# Patient Record
Sex: MALE | Race: WHITE
[De-identification: names, ages, dates, MRNs, and addresses within clinical notes are randomized per-mention and may not be internally consistent; named-entity substitution may affect disease eponyms.]

---

## 2019-09-18 ENCOUNTER — HOSPITAL ENCOUNTER (EMERGENCY)
Dept: HOSPITAL 75 - ER | Age: 39
Discharge: HOME | End: 2019-09-18
Payer: COMMERCIAL

## 2019-09-18 VITALS — HEIGHT: 65.75 IN | BODY MASS INDEX: 32.41 KG/M2 | WEIGHT: 199.3 LBS

## 2019-09-18 VITALS — SYSTOLIC BLOOD PRESSURE: 110 MMHG | DIASTOLIC BLOOD PRESSURE: 73 MMHG

## 2019-09-18 DIAGNOSIS — I10: ICD-10-CM

## 2019-09-18 DIAGNOSIS — N13.2: Primary | ICD-10-CM

## 2019-09-18 LAB
ALBUMIN SERPL-MCNC: 4.4 GM/DL (ref 3.2–4.5)
ALP SERPL-CCNC: 80 U/L (ref 40–136)
ALT SERPL-CCNC: 57 U/L (ref 0–55)
APTT PPP: YELLOW S
BACTERIA #/AREA URNS HPF: (no result) /HPF
BASOPHILS # BLD AUTO: 0 10^3/UL (ref 0–0.1)
BASOPHILS NFR BLD AUTO: 0 % (ref 0–10)
BILIRUB SERPL-MCNC: 0.8 MG/DL (ref 0.1–1)
BILIRUB UR QL STRIP: NEGATIVE
BUN/CREAT SERPL: 13
CALCIUM SERPL-MCNC: 9.4 MG/DL (ref 8.5–10.1)
CHLORIDE SERPL-SCNC: 106 MMOL/L (ref 98–107)
CO2 SERPL-SCNC: 24 MMOL/L (ref 21–32)
CREAT SERPL-MCNC: 0.95 MG/DL (ref 0.6–1.3)
EOSINOPHIL # BLD AUTO: 0 10^3/UL (ref 0–0.3)
EOSINOPHIL NFR BLD AUTO: 0 % (ref 0–10)
ERYTHROCYTE [DISTWIDTH] IN BLOOD BY AUTOMATED COUNT: 11.6 % (ref 10–14.5)
FIBRINOGEN PPP-MCNC: CLEAR MG/DL
GFR SERPLBLD BASED ON 1.73 SQ M-ARVRAT: > 60 ML/MIN
GLUCOSE SERPL-MCNC: 97 MG/DL (ref 70–105)
GLUCOSE UR STRIP-MCNC: NEGATIVE MG/DL
HCT VFR BLD CALC: 47 % (ref 40–54)
HGB BLD-MCNC: 15.8 G/DL (ref 13.3–17.7)
KETONES UR QL STRIP: (no result)
LEUKOCYTE ESTERASE UR QL STRIP: NEGATIVE
LYMPHOCYTES # BLD AUTO: 0.9 X 10^3 (ref 1–4)
LYMPHOCYTES NFR BLD AUTO: 7 % (ref 12–44)
MANUAL DIFFERENTIAL PERFORMED BLD QL: YES
MCH RBC QN AUTO: 32 PG (ref 25–34)
MCHC RBC AUTO-ENTMCNC: 34 G/DL (ref 32–36)
MCV RBC AUTO: 96 FL (ref 80–99)
MONOCYTES # BLD AUTO: 0.8 X 10^3 (ref 0–1)
MONOCYTES NFR BLD AUTO: 6 % (ref 0–12)
MONOCYTES NFR BLD: 7 %
NEUTROPHILS # BLD AUTO: 11.5 X 10^3 (ref 1.8–7.8)
NEUTROPHILS NFR BLD AUTO: 87 % (ref 42–75)
NEUTS BAND NFR BLD MANUAL: 88 %
NEUTS BAND NFR BLD: 1 %
NITRITE UR QL STRIP: NEGATIVE
PH UR STRIP: 6 [PH] (ref 5–9)
PLATELET # BLD: 237 10^3/UL (ref 130–400)
PMV BLD AUTO: 10.4 FL (ref 7.4–10.4)
POTASSIUM SERPL-SCNC: 4.1 MMOL/L (ref 3.6–5)
PROT SERPL-MCNC: 7.4 GM/DL (ref 6.4–8.2)
PROT UR QL STRIP: (no result)
RBC MORPH BLD: NORMAL
SODIUM SERPL-SCNC: 137 MMOL/L (ref 135–145)
SP GR UR STRIP: 1.01 (ref 1.02–1.02)
UROBILINOGEN UR-MCNC: NORMAL MG/DL
VARIANT LYMPHS NFR BLD MANUAL: 4 %
WBC # BLD AUTO: 13.2 10^3/UL (ref 4.3–11)
WBC #/AREA URNS HPF: (no result) /HPF

## 2019-09-18 PROCEDURE — 85007 BL SMEAR W/DIFF WBC COUNT: CPT

## 2019-09-18 PROCEDURE — 81000 URINALYSIS NONAUTO W/SCOPE: CPT

## 2019-09-18 PROCEDURE — 74176 CT ABD & PELVIS W/O CONTRAST: CPT

## 2019-09-18 PROCEDURE — 96375 TX/PRO/DX INJ NEW DRUG ADDON: CPT

## 2019-09-18 PROCEDURE — 85027 COMPLETE CBC AUTOMATED: CPT

## 2019-09-18 PROCEDURE — 96374 THER/PROPH/DIAG INJ IV PUSH: CPT

## 2019-09-18 PROCEDURE — 36415 COLL VENOUS BLD VENIPUNCTURE: CPT

## 2019-09-18 PROCEDURE — 80053 COMPREHEN METABOLIC PANEL: CPT

## 2019-09-18 NOTE — ED ABDOMINAL PAIN
General


Chief Complaint:  Abdominal/GI Problems


Stated Complaint:  R SIDE PAIN


Nursing Triage Note:  


Pt c/o R sided abd pain and vomiting since 0730 today. Pt reports vomiting 4-5 


times today. Pt states pain started in R flank, radiating to testicle. Pt now 


stating pain is more in abdomen.


Sepsis Screen:  No Definite Risk





History of Present Illness


Date Seen by Provider:  Sep 18, 2019


Time Seen by Provider:  12:43


Initial Comments


Patient admitted with chief complaint right flank pain, radiating to the right 

testicle. Reports onset of 0730 this am. Reports approx. 8 episodes of vomiting.

Denies fever and chills


Severity/Quality:  Mild


Location:  Flank


Radiation:  Groin


Associated Symptoms:  No Chest Pain, No Diaphoresis, No Fever/Chills





Allergies and Home Medications


Allergies


Coded Allergies:  


     No Known Drug Allergies (Unverified , 9/18/19)





Home Medications


Hydrocodone/Acetaminophen 1 Each Tablet, 1 TAB PO Q4-6HR


   Prescribed by: RAMA GUZMAN on 9/18/19 1318


Ketorolac Tromethamine 10 Mg Tablet, 10 MG PO Q6H


   Prescribed by: RAMA GUZMAN on 9/18/19 1318


Ondansetron 8 Mg Tab.rapdis, 8 MG PO Q6H PRN for NAUSEA/VOMITING


   Prescribed by: RAMA GUZMAN on 9/18/19 1318


Tamsulosin HCl 0.4 Mg Cap, 0.4 MG PO DAILY


   Prescribed by: RAMA GUZMAN on 9/18/19 1318





Patient Home Medication List


Home Medication List Reviewed:  Yes





Review of Systems


Review of Systems


Constitutional:  No chills, No diaphoresis, No fever


EENTM:  No Symptoms Reported


Respiratory:  Denies Shortness of Air


Cardiovascular:  Denies Chest Pain


Genitourinary:  Flank Pain; Denies Hematuria


Musculoskeletal:  no symptoms reported


Skin:  no symptoms reported





Past Medical-Social-Family Hx


Patient Social History


Alcohol Use:  Occasionally Uses


Recreational Drug Use:  No


Smoking Status:  Never a Smoker


Recent Foreign Travel:  No


Contact w/Someone Who Travel:  No


Recent Infectious Disease Expo:  No


Recent Hopitalizations:  No





Seasonal Allergies


Seasonal Allergies:  No





Past Medical History


Surgeries:  Yes (hernia, ACL)


Abdominal, Orthopedic


Respiratory:  No


Cardiac:  Yes


Hypertension


Neurological:  No


Genitourinary:  No


Gastrointestinal:  No


Musculoskeletal:  No


Endocrine:  No


HEENT:  No


Cancer:  No


Psychosocial:  No


Integumentary:  No


Blood Disorders:  No





Physical Exam


Vital Signs





Vital Signs - First Documented








 9/18/19





 10:12


 


Temp 36.7


 


Pulse 70


 


Resp 18


 


B/P (MAP) 139/100 (113)


 


Pulse Ox 97


 


O2 Delivery Room Air





Capillary Refill : Less Than 3 Seconds


Height/Weight/BMI


Height: '"


Weight: lbs. oz. kg; 32.00 BMI


Method:


General Appearance:  WD/WN


HEENT:  PERRL/EOMI, normal ENT inspection


Neck:  non-tender, full range of motion


Respiratory:  chest non-tender, lungs clear, normal breath sounds, no 

respiratory distress


Cardiovascular:  regular rate, rhythm, no edema, no murmur


Gastrointestinal:  normal bowel sounds, no organomegaly, tenderness


Extremities:  normal range of motion, non-tender


Back:  normal inspection, CVA tenderness (R)


Neurologic/Psychiatric:  no motor/sensory deficits, alert, normal mood/affect


Skin:  normal color, warm/dry





Progress/Results/Core Measures


Results/Orders


Lab Results





Laboratory Tests








Test


 9/18/19


12:51 9/18/19


13:59 Range/Units


 


 


White Blood Count


 13.2 H


 


 4.3-11.0


10^3/uL


 


Red Blood Count


 4.91 


 


 4.35-5.85


10^6/uL


 


Hemoglobin 15.8   13.3-17.7  G/DL


 


Hematocrit 47   40-54  %


 


Mean Corpuscular Volume 96   80-99  FL


 


Mean Corpuscular Hemoglobin 32   25-34  PG


 


Mean Corpuscular Hemoglobin


Concent 34 


 


 32-36  G/DL





 


Red Cell Distribution Width 11.6   10.0-14.5  %


 


Platelet Count


 237 


 


 130-400


10^3/uL


 


Mean Platelet Volume 10.4   7.4-10.4  FL


 


Neutrophils (%) (Auto) 87 H  42-75  %


 


Lymphocytes (%) (Auto) 7 L  12-44  %


 


Monocytes (%) (Auto) 6   0-12  %


 


Eosinophils (%) (Auto) 0   0-10  %


 


Basophils (%) (Auto) 0   0-10  %


 


Neutrophils # (Auto) 11.5 H  1.8-7.8  X 10^3


 


Lymphocytes # (Auto) 0.9 L  1.0-4.0  X 10^3


 


Monocytes # (Auto) 0.8   0.0-1.0  X 10^3


 


Eosinophils # (Auto)


 0.0 


 


 0.0-0.3


10^3/uL


 


Basophils # (Auto)


 0.0 


 


 0.0-0.1


10^3/uL


 


Neutrophils % (Manual) 88    %


 


Lymphocytes % (Manual) 4    %


 


Monocytes % (Manual) 7    %


 


Band Neutrophils 1    %


 


Blood Morphology Comment NORMAL    


 


Sodium Level 137   135-145  MMOL/L


 


Potassium Level 4.1   3.6-5.0  MMOL/L


 


Chloride Level 106     MMOL/L


 


Carbon Dioxide Level 24   21-32  MMOL/L


 


Anion Gap 7   5-14  MMOL/L


 


Blood Urea Nitrogen 12   7-18  MG/DL


 


Creatinine


 0.95 


 


 0.60-1.30


MG/DL


 


Estimat Glomerular Filtration


Rate > 60 


 


  





 


BUN/Creatinine Ratio 13    


 


Glucose Level 97     MG/DL


 


Calcium Level 9.4   8.5-10.1  MG/DL


 


Corrected Calcium 9.1   8.5-10.1  MG/DL


 


Total Bilirubin 0.8   0.1-1.0  MG/DL


 


Aspartate Amino Transf


(AST/SGOT) 31 


 


 5-34  U/L





 


Alanine Aminotransferase


(ALT/SGPT) 57 H


 


 0-55  U/L





 


Alkaline Phosphatase 80     U/L


 


Total Protein 7.4   6.4-8.2  GM/DL


 


Albumin 4.4   3.2-4.5  GM/DL


 


Urine Color  YELLOW   


 


Urine Clarity  CLEAR   


 


Urine pH  6  5-9  


 


Urine Specific Gravity  1.015 L 1.016-1.022  


 


Urine Protein  2+ H NEGATIVE  


 


Urine Glucose (UA)  NEGATIVE  NEGATIVE  


 


Urine Ketones  2+ H NEGATIVE  


 


Urine Nitrite  NEGATIVE  NEGATIVE  


 


Urine Bilirubin  NEGATIVE  NEGATIVE  


 


Urine Urobilinogen  NORMAL  NORMAL  MG/DL


 


Urine Leukocyte Esterase  NEGATIVE  NEGATIVE  


 


Urine RBC (Auto)  5+ H NEGATIVE  


 


Urine RBC  10-25 H  /HPF


 


Urine WBC  NONE   /HPF


 


Urine Crystals  NONE   /LPF


 


Urine Bacteria  TRACE   /HPF


 


Urine Casts  PRESENT   /LPF


 


Urine Hyaline Casts  10-25 H  /LPF


 


Urine Mucus  MODERATE H  /LPF


 


Urine Culture Indicated  NO   








My Orders





Orders - RAMA GUZMAN


Ketorolac Injection (Toradol Injection) (9/18/19 12:45)


Ua Culture If Indicated (9/18/19 12:32)


Cbc With Automated Diff (9/18/19 12:32)


Comprehensive Metabolic Panel (9/18/19 12:32)


Ed Iv/Invasive Line Start (9/18/19 12:32)


Ct Abd/Pelvis Wo(Kidney Stone) (9/18/19 12:32)


Fentanyl  Injection (Sublimaze Injection (9/18/19 12:45)


Ondansetron Injection (Zofran Injectio (9/18/19 13:00)


Manual Differential (9/18/19 12:51)





Medications Given in ED





Current Medications








 Medications  Dose


 Ordered  Sig/Ngoc


 Route  Start Time


 Stop Time Status Last Admin


Dose Admin


 


 Fentanyl Citrate  50 mcg  ONCE  ONCE


 IVP  9/18/19 12:45


 9/18/19 12:46 DC 9/18/19 12:43


50 MCG


 


 Ketorolac


 Tromethamine  15 mg  ONCE  ONCE


 IVP  9/18/19 12:45


 9/18/19 12:46 DC 9/18/19 12:41


15 MG


 


 Ondansetron HCl  4 mg  ONCE  ONCE


 IVP  9/18/19 13:00


 9/18/19 13:01 DC 9/18/19 14:31


4 MG








Vital Signs/I&O











 9/18/19 9/18/19





 10:12 15:15


 


Temp 36.7 


 


Pulse 70 61


 


Resp 18 18


 


B/P (MAP) 139/100 (113) 110/73


 


Pulse Ox 97 95


 


O2 Delivery Room Air Room Air














Blood Pressure Mean:                    113











Departure


Impression





   Primary Impression:  


   Right ureteral stone


Disposition:  01 HOME, SELF-CARE


Condition:  Stable





Departure-Patient Inst.


Decision time for Depature:  13:16


Patient Instructions:  Kidney Stone Diet





Add. Discharge Instructions:  


1. Return to ER for any concerns


2. Medication as directed


3. Follow-up with your doctor next week


All discharge instructions reviewed with patient and/or family. Voiced 

understanding.


Scripts


Ondansetron (Ondansetron Odt) 8 Mg Tab.rapdis


8 MG PO Q6H PRN for NAUSEA/VOMITING, #14 TAB


   Prov: RAMA GUZMAN         9/18/19 


Tamsulosin HCl (Flomax) 0.4 Mg Cap


0.4 MG PO DAILY, #20 CAP


   Prov: RAMA GUZMAN         9/18/19 


Hydrocodone/Acetaminophen (Norco 5-325 Tablet) 1 Each Tablet


1 TAB PO Q4-6HR for Pain MDD 10 TABS for 7 Days, #30 TAB


   Prov: RAMA GUZMAN         9/18/19 


Ketorolac Tromethamine (Ketorolac Tromethamine) 10 Mg Tablet


10 MG PO Q6H, #20 TAB


   Prov: RAMA GUZMAN         9/18/19


Work/School Note:  Work Release Form   Date Seen in the Emergency Department:  

Sep 18, 2019


   Return to Work:  Sep 19, 2019











RAMA GUZMAN             Sep 18, 2019 12:46

## 2019-09-18 NOTE — DIAGNOSTIC IMAGING REPORT
PROCEDURE: CT urinary tract, rule out kidney stone.



TECHNIQUE: Multiple contiguous axial images were obtained through

the abdomen and pelvis without the use of intravenous contrast.

Auto Exposure Controls were utilized during the CT exam to meet

ALARA standards for radiation dose reduction. 



INDICATION:  Right back pain.



COMPARISON: No prior studies are available for comparison.



FINDINGS: The lung bases are clear. The liver and gallbladder are

unremarkable. No discrete liver mass is detected. No biliary

ductal dilatation is seen. The pancreas and spleen are

unremarkable. No adrenal mass is detected. The left kidney is

unremarkable. The right kidney contains a tiny punctate

nonobstructing calculus in the lower pole. There is a 3 mm

calculus located in the mid right ureter producing mild

hydronephrosis. No other ureteral calculi are seen. No bladder

calculi are detected. Small and large bowel loops are normal

caliber. The appendix is visualized and unremarkable. There is no

ascites. Prostate is unremarkable.



IMPRESSION: Tiny nonobstructing right-sided nephrolithiasis. In

addition, there is a 3 mm mid right ureteric calculus producing

mild hydronephrosis. No other significant abnormality is seen.



Dictated by: 



  Dictated on workstation # [id number]